# Patient Record
Sex: FEMALE | Race: BLACK OR AFRICAN AMERICAN | Employment: UNEMPLOYED | URBAN - METROPOLITAN AREA
[De-identification: names, ages, dates, MRNs, and addresses within clinical notes are randomized per-mention and may not be internally consistent; named-entity substitution may affect disease eponyms.]

---

## 2019-03-16 ENCOUNTER — HOSPITAL ENCOUNTER (EMERGENCY)
Age: 29
Discharge: HOME | End: 2019-03-16
Attending: EMERGENCY MEDICINE
Payer: COMMERCIAL

## 2019-03-16 VITALS
OXYGEN SATURATION: 99 % | TEMPERATURE: 98 F | WEIGHT: 137.57 LBS | SYSTOLIC BLOOD PRESSURE: 106 MMHG | HEART RATE: 76 BPM | BODY MASS INDEX: 25.16 KG/M2 | DIASTOLIC BLOOD PRESSURE: 64 MMHG | RESPIRATION RATE: 15 BRPM

## 2019-03-16 DIAGNOSIS — N39.0 URINARY TRACT INFECTION WITHOUT HEMATURIA, SITE UNSPECIFIED: Primary | ICD-10-CM

## 2019-03-16 LAB
BACTERIA: ABNORMAL /HPF
BILIRUBIN URINE: NEGATIVE
BLOOD, URINE: ABNORMAL
CLARITY: ABNORMAL
COLOR: ABNORMAL
EPITHELIAL CELLS, UA: ABNORMAL /HPF
GLUCOSE URINE: NEGATIVE MG/DL
HCG(URINE) PREGNANCY TEST: NEGATIVE
KETONES, URINE: NEGATIVE MG/DL
LEUKOCYTE ESTERASE, URINE: ABNORMAL
MICROSCOPIC EXAMINATION: YES
NITRITE, URINE: NEGATIVE
PH UA: 7 (ref 5–8)
PROTEIN UA: ABNORMAL MG/DL
RBC UA: ABNORMAL /HPF (ref 0–2)
RENAL EPITHELIAL, UA: ABNORMAL /HPF
SPECIFIC GRAVITY UA: 1.02 (ref 1–1.03)
URINE REFLEX TO CULTURE: YES
URINE TYPE: ABNORMAL
UROBILINOGEN, URINE: 0.2 E.U./DL
WBC UA: >100 /HPF (ref 0–5)

## 2019-03-16 PROCEDURE — 99283 EMERGENCY DEPT VISIT LOW MDM: CPT

## 2019-03-16 PROCEDURE — 81001 URINALYSIS AUTO W/SCOPE: CPT

## 2019-03-16 PROCEDURE — 87077 CULTURE AEROBIC IDENTIFY: CPT

## 2019-03-16 PROCEDURE — 87086 URINE CULTURE/COLONY COUNT: CPT

## 2019-03-16 PROCEDURE — 87186 SC STD MICRODIL/AGAR DIL: CPT

## 2019-03-16 PROCEDURE — 84703 CHORIONIC GONADOTROPIN ASSAY: CPT

## 2019-03-16 RX ORDER — PHENAZOPYRIDINE HYDROCHLORIDE 100 MG/1
100 TABLET, FILM COATED ORAL 3 TIMES DAILY PRN
Qty: 6 TABLET | Refills: 0 | Status: SHIPPED | OUTPATIENT
Start: 2019-03-16 | End: 2019-03-19

## 2019-03-16 RX ORDER — CEPHALEXIN 500 MG/1
500 CAPSULE ORAL 2 TIMES DAILY
Qty: 10 CAPSULE | Refills: 0 | Status: SHIPPED | OUTPATIENT
Start: 2019-03-16 | End: 2019-03-21

## 2019-03-16 ASSESSMENT — PAIN DESCRIPTION - ORIENTATION
ORIENTATION: MID
ORIENTATION: MID

## 2019-03-16 ASSESSMENT — PAIN DESCRIPTION - PROGRESSION
CLINICAL_PROGRESSION: NOT CHANGED
CLINICAL_PROGRESSION: NOT CHANGED

## 2019-03-16 ASSESSMENT — PAIN DESCRIPTION - ONSET
ONSET: ON-GOING
ONSET: ON-GOING

## 2019-03-16 ASSESSMENT — PAIN DESCRIPTION - FREQUENCY
FREQUENCY: INTERMITTENT
FREQUENCY: INTERMITTENT

## 2019-03-16 ASSESSMENT — PAIN DESCRIPTION - DESCRIPTORS
DESCRIPTORS: DISCOMFORT
DESCRIPTORS: DISCOMFORT;BURNING

## 2019-03-16 ASSESSMENT — PAIN DESCRIPTION - LOCATION
LOCATION: PELVIS
LOCATION: PELVIS

## 2019-03-16 ASSESSMENT — PAIN SCALES - GENERAL
PAINLEVEL_OUTOF10: 5
PAINLEVEL_OUTOF10: 4

## 2019-03-18 LAB
ORGANISM: ABNORMAL
URINE CULTURE, ROUTINE: ABNORMAL
URINE CULTURE, ROUTINE: ABNORMAL

## 2022-03-16 ENCOUNTER — APPOINTMENT (OUTPATIENT)
Dept: ULTRASOUND IMAGING | Age: 32
End: 2022-03-16
Payer: COMMERCIAL

## 2022-03-16 ENCOUNTER — HOSPITAL ENCOUNTER (EMERGENCY)
Age: 32
Discharge: HOME | End: 2022-03-16
Payer: COMMERCIAL

## 2022-03-16 VITALS
BODY MASS INDEX: 25.23 KG/M2 | SYSTOLIC BLOOD PRESSURE: 108 MMHG | RESPIRATION RATE: 18 BRPM | WEIGHT: 137.13 LBS | HEART RATE: 74 BPM | TEMPERATURE: 97.5 F | OXYGEN SATURATION: 99 % | DIASTOLIC BLOOD PRESSURE: 68 MMHG | HEIGHT: 62 IN

## 2022-03-16 DIAGNOSIS — Z33.1 IUP (INTRAUTERINE PREGNANCY), INCIDENTAL: ICD-10-CM

## 2022-03-16 DIAGNOSIS — Z32.01 POSITIVE PREGNANCY TEST: ICD-10-CM

## 2022-03-16 DIAGNOSIS — N83.202 LEFT OVARIAN CYST: ICD-10-CM

## 2022-03-16 DIAGNOSIS — R10.30 LOWER ABDOMINAL PAIN: Primary | ICD-10-CM

## 2022-03-16 DIAGNOSIS — Z20.2 STD EXPOSURE: ICD-10-CM

## 2022-03-16 LAB
BACTERIA WET PREP: NORMAL
BASOPHILS ABSOLUTE: 0.1 K/UL (ref 0–0.2)
BASOPHILS RELATIVE PERCENT: 0.9 %
BILIRUBIN URINE: NEGATIVE
BLOOD, URINE: NEGATIVE
CLARITY: CLEAR
CLUE CELLS: NORMAL
COLOR: YELLOW
EOSINOPHILS ABSOLUTE: 0.1 K/UL (ref 0–0.6)
EOSINOPHILS RELATIVE PERCENT: 1.9 %
EPITHELIAL CELLS WET PREP: NORMAL
GLUCOSE URINE: NEGATIVE MG/DL
GONADOTROPIN, CHORIONIC (HCG) QUANT: 3385 MIU/ML
HCG(URINE) PREGNANCY TEST: POSITIVE
HCT VFR BLD CALC: 37.9 % (ref 36–48)
HEMOGLOBIN: 12.5 G/DL (ref 12–16)
KETONES, URINE: NEGATIVE MG/DL
LEUKOCYTE ESTERASE, URINE: NEGATIVE
LYMPHOCYTES ABSOLUTE: 2.6 K/UL (ref 1–5.1)
LYMPHOCYTES RELATIVE PERCENT: 40.7 %
MCH RBC QN AUTO: 28.7 PG (ref 26–34)
MCHC RBC AUTO-ENTMCNC: 33.1 G/DL (ref 31–36)
MCV RBC AUTO: 86.8 FL (ref 80–100)
MICROSCOPIC EXAMINATION: NORMAL
MONOCYTES ABSOLUTE: 0.6 K/UL (ref 0–1.3)
MONOCYTES RELATIVE PERCENT: 8.9 %
NEUTROPHILS ABSOLUTE: 3 K/UL (ref 1.7–7.7)
NEUTROPHILS RELATIVE PERCENT: 47.6 %
NITRITE, URINE: NEGATIVE
PDW BLD-RTO: 14.3 % (ref 12.4–15.4)
PH UA: 6.5 (ref 5–8)
PLATELET # BLD: 352 K/UL (ref 135–450)
PMV BLD AUTO: 12.6 FL (ref 5–10.5)
PROTEIN UA: NEGATIVE MG/DL
RBC # BLD: 4.36 M/UL (ref 4–5.2)
RBC WET PREP: NORMAL
SOURCE WET PREP: NORMAL
SPECIFIC GRAVITY UA: <=1.005 (ref 1–1.03)
TRICHOMONAS PREP: NORMAL
URINE REFLEX TO CULTURE: NORMAL
URINE TYPE: NORMAL
UROBILINOGEN, URINE: 0.2 E.U./DL
WBC # BLD: 6.4 K/UL (ref 4–11)
WBC WET PREP: NORMAL
YEAST WET PREP: NORMAL

## 2022-03-16 PROCEDURE — 36415 COLL VENOUS BLD VENIPUNCTURE: CPT

## 2022-03-16 PROCEDURE — 96372 THER/PROPH/DIAG INJ SC/IM: CPT

## 2022-03-16 PROCEDURE — 81003 URINALYSIS AUTO W/O SCOPE: CPT

## 2022-03-16 PROCEDURE — 6370000000 HC RX 637 (ALT 250 FOR IP): Performed by: PHYSICIAN ASSISTANT

## 2022-03-16 PROCEDURE — 99282 EMERGENCY DEPT VISIT SF MDM: CPT

## 2022-03-16 PROCEDURE — 87210 SMEAR WET MOUNT SALINE/INK: CPT

## 2022-03-16 PROCEDURE — 6360000002 HC RX W HCPCS: Performed by: PHYSICIAN ASSISTANT

## 2022-03-16 PROCEDURE — 84702 CHORIONIC GONADOTROPIN TEST: CPT

## 2022-03-16 PROCEDURE — 87491 CHLMYD TRACH DNA AMP PROBE: CPT

## 2022-03-16 PROCEDURE — 87591 N.GONORRHOEAE DNA AMP PROB: CPT

## 2022-03-16 PROCEDURE — 85025 COMPLETE CBC W/AUTO DIFF WBC: CPT

## 2022-03-16 PROCEDURE — 84703 CHORIONIC GONADOTROPIN ASSAY: CPT

## 2022-03-16 PROCEDURE — 76817 TRANSVAGINAL US OBSTETRIC: CPT

## 2022-03-16 RX ORDER — AZITHROMYCIN 500 MG/1
1000 TABLET, FILM COATED ORAL ONCE
Status: COMPLETED | OUTPATIENT
Start: 2022-03-16 | End: 2022-03-16

## 2022-03-16 RX ORDER — ACETAMINOPHEN 500 MG
1000 TABLET ORAL 4 TIMES DAILY PRN
Qty: 60 TABLET | Refills: 0 | Status: SHIPPED | OUTPATIENT
Start: 2022-03-16

## 2022-03-16 RX ORDER — PRENATAL WITH FERROUS FUM AND FOLIC ACID 3080; 920; 120; 400; 22; 1.84; 3; 20; 10; 1; 12; 200; 27; 25; 2 [IU]/1; [IU]/1; MG/1; [IU]/1; MG/1; MG/1; MG/1; MG/1; MG/1; MG/1; UG/1; MG/1; MG/1; MG/1; MG/1
1 TABLET ORAL DAILY
Qty: 90 TABLET | Refills: 3 | Status: SHIPPED | OUTPATIENT
Start: 2022-03-16

## 2022-03-16 RX ORDER — CEFTRIAXONE 500 MG/1
500 INJECTION, POWDER, FOR SOLUTION INTRAMUSCULAR; INTRAVENOUS ONCE
Status: COMPLETED | OUTPATIENT
Start: 2022-03-16 | End: 2022-03-16

## 2022-03-16 RX ORDER — ACETAMINOPHEN 325 MG/1
650 TABLET ORAL ONCE
Status: COMPLETED | OUTPATIENT
Start: 2022-03-16 | End: 2022-03-16

## 2022-03-16 RX ADMIN — CEFTRIAXONE SODIUM 500 MG: 500 INJECTION, POWDER, FOR SOLUTION INTRAMUSCULAR; INTRAVENOUS at 14:09

## 2022-03-16 RX ADMIN — ACETAMINOPHEN 650 MG: 325 TABLET ORAL at 14:08

## 2022-03-16 RX ADMIN — AZITHROMYCIN 1000 MG: 500 TABLET, FILM COATED ORAL at 14:09

## 2022-03-16 ASSESSMENT — ENCOUNTER SYMPTOMS
ABDOMINAL PAIN: 1
COUGH: 0
SHORTNESS OF BREATH: 0
NAUSEA: 0
VOMITING: 0

## 2022-03-16 ASSESSMENT — PAIN SCALES - GENERAL: PAINLEVEL_OUTOF10: 0

## 2022-03-16 NOTE — ED NOTES
Pt transferred to [facility] for [facility], Pt stable and goes in private car     Shilpa Cabral, RN  03/16/22 3785

## 2022-03-16 NOTE — ED PROVIDER NOTES
[address]  [address] [address]  Dept: [phone]  Loc: [address] ENCOUNTER        This patient was not seen or evaluated by the attending physician. I evaluated this patient, the attending physician was available for consultation. CHIEF COMPLAINT    Chief Complaint   Patient presents with    Dysuria     x2wks, pain before urination, vaginal discharge with smell, concerns for UTI/STD and pregnancy       HPI    Sanjay Thakkar is a 27 y.o. female who presents from [facility] for a transvaginal ultrasound. Patient went to [facility] with complaints of abdominal pain localized in the left side of the abdomen. Onset was about 2 weeks. Had some pain before urination as well. Some unusual foul-smelling vaginal discharge and she was concern for STD. She was incidentally found to be pregnant. Did not know that she was pregnant. Has been pregnant before in the past, but has been several years. I received a call from [facility] at [facility], they do not have the ability to perform transvaginal ultrasounds at the freestanding emergency department, they had sent a quantitative which is pending at the time of transfer but she transferred the patient to [facility] emergency department to rule out ectopic. On arrival patient's pain is a 0 out of 10. She denies any vaginal bleeding or spotting. Came to the emergency department as directed for a ultrasound    REVIEW OF SYSTEMS    General: No fevers or chills  : No dysuria or flank pain  GI: see HPI, no vomiting, no diarrhea  Pulmonary: No difficulty breathing or cough  Neurologic: No loss of consciousness or syncope  See HPI for further details. All other systems reviewed and are negative. PAST MEDICAL & SURGICAL HISTORY    No past medical history on file.   No past surgical history on file.    CURRENT MEDICATIONS    Current Outpatient Rx   Medication Sig Dispense Refill    Prenatal Vit-Fe Fumarate-FA (PRENATAL VITAMIN) 27-1 MG TABS tablet Take 1 tablet by mouth daily 90 tablet 3    acetaminophen (TYLENOL) 500 MG tablet Take 2 tablets by mouth 4 times daily as needed for Pain 60 tablet 0       ALLERGIES    No Known Allergies    FAMILY AND SOCIAL HISTORY    No family history on file. Social History     Socioeconomic History    Marital status: Single     Spouse name: Not on file    Number of children: Not on file    Years of education: Not on file    Highest education level: Not on file   Occupational History    Not on file   Tobacco Use    Smoking status: Former Smoker    Smokeless tobacco: Never Used   Substance and Sexual Activity    Alcohol use: No    Drug use: No    Sexual activity: Not on file   Other Topics Concern    Not on file   Social History Narrative    Not on file     Social Determinants of Health     Financial Resource Strain:     Difficulty of Paying Living Expenses: Not on file   Food Insecurity:     Worried About [address] in the Last Year: Not on file    [address] in the Last Year: Not on file   Transportation Needs:     Lack of Transportation (Medical): Not on file    Lack of Transportation (Non-Medical):  Not on file   Physical Activity:     Days of Exercise per Week: Not on file    Minutes of Exercise per Session: Not on file   Stress:     Feeling of Stress : Not on file   Social Connections:     Frequency of Communication with Friends and Family: Not on file    Frequency of Social Gatherings with Friends and Family: Not on file    Attends Christian Services: Not on file    Active Member of Clubs or Organizations: Not on file    Attends Club or Organization Meetings: Not on file    Marital Status: Not on file   Intimate Partner Violence:     Fear of Current or Ex-Partner: Not on file    Emotionally Abused: Not on file    Physically Shock, Rh incompatibility, UTI, placenta previa, other    CRITICAL CARE NOTE:  There was a high probability of clinically significant life-threatening deterioration of the patient's condition requiring my urgent intervention. Total critical care time was at least 5 minutes. This includes vital sign monitoring, pulse oximetry monitoring, telemetry monitoring, clinical response to the IV medications, reviewing the nursing notes, consultation time, dictation/documentation time, and interpretation of the labwork. This excludes any separately billable procedures performed. Lab work and [facility] note and results were reviewed from patient's transfer to [facility].    Patient is afebrile nontoxic in appearance. She does not appear to be in any acute distress. Is not endorsing any pain whatsoever currently. She has no leukocytosis. No anemia. Urinalysis is completely unremarkable. Her quantitative that was pending at the time of her transfer did result while she was awaiting her ultrasound test results. Quantitative is 3385. Wet prep was grossly unremarkable. She was empirically treated for gonorrhea and chlamydia, please see [facility] note. Ella Hussein also had prescribed her prenatals. I will prescribe her some acetaminophen. On patient's arrival I ordered the transvaginal ultrasound    Ultrasound findings as above. I have a low index of suspicion that that cyst is products of conception as she does have a positive IUP on ultrasound. She has normal Doppler flow so this is not consistent with ovarian torsion. She has no current signs of pain, does not appear to be an ectopic pregnancy. She does however need close outpatient follow-up with OB/GYN. I will give her referral as she has no OB/GYN at this time.   She is in agreement with the plan of discharge and verbalized understanding to return immediately for any new or worsening symptoms and has no further questions or concerns and will be discharged home in stable condition. FINAL IMPRESSION    1. Lower abdominal pain    2. Positive pregnancy test    3. STD exposure    4. IUP (intrauterine pregnancy), incidental    5.  Left ovarian cyst        PLAN  Discharge with close outpatient PCP follow up    (Please note that this note was completed with a voice recognition program.  Every attempt was made to edit the dictations, but inevitably there remain words that are mis-transcribed.)         Ruthie Hancock, TANYA - THOMAS  03/16/22 2034

## 2022-03-16 NOTE — Clinical Note
Pamela Decker was seen and treated in our emergency department on 3/16/2022. She may return to work on 03/17/2022. If you have any questions or concerns, please don't hesitate to call.       Mony Diez, TANYA - CNP

## 2022-03-16 NOTE — ED PROVIDER NOTES
[facility]        Pt Name: Sonia Corado  MRN: [id number]  [place] 1990  Date of evaluation: 3/16/2022  Provider: KOKO Salmeron  PCP: No primary care provider on file. Note Started: 12:59 PM EDT       STEPHEN. I have evaluated this patient. My supervising physician was available for consultation. CHIEF COMPLAINT       Chief Complaint   Patient presents with    Dysuria     x2wks, pain before urination, vaginal discharge with smell, concerns for UTI/STD and pregnancy       HISTORY OF PRESENT ILLNESS   (Location, Timing/Onset, Context/Setting, Quality, Duration, Modifying Factors, Severity, Associated Signs and Symptoms)  Note limiting factors. Chief Complaint: Pain with urination, vaginal discahrge x 2 weeks     Sonia Corado is a 27 y.o. female who presents to the ED today with complaints of burning with urination, vaginal discharge that has been present for the last two weeks. LMP 02/10/2022. . Has not tried any pregnancy tests at home. Wonders if she could be pregnant. Has had intermittent cramping, especially in the left lower abdominal area. Last time she had the pain was last night, she is currently pain free. Denies vaginal bleeding. Follows with OBGYN at [facility]. Has no further complaints at this time. Nursing Notes were all reviewed and agreed with or any disagreements were addressed in the HPI. REVIEW OF SYSTEMS    (2-9 systems for level 4, 10 or more for level 5)     Review of Systems   Constitutional: Negative for chills and fever. Respiratory: Negative for cough and shortness of breath. Cardiovascular: Negative for chest pain and palpitations. Gastrointestinal: Positive for abdominal pain. Negative for nausea and vomiting. Genitourinary: Positive for dysuria, frequency, pelvic pain and vaginal discharge. Negative for difficulty urinating, urgency and vaginal bleeding.        Positives and Pertinent negatives as per HPI. Except as noted above in the ROS, all other systems were reviewed and negative. PAST MEDICAL HISTORY   No past medical history on file. SURGICAL HISTORY   No past surgical history on file. CURRENTMEDICATIONS       Previous Medications    No medications on file         ALLERGIES     Patient has no known allergies. FAMILYHISTORY     No family history on file. SOCIAL HISTORY       Social History     Tobacco Use    Smoking status: Former Smoker    Smokeless tobacco: Never Used   Substance Use Topics    Alcohol use: No    Drug use: No       SCREENINGS    Mikey Coma Scale  Eye Opening: Spontaneous  Best Verbal Response: Oriented  Best Motor Response: Obeys commands  Dawson Coma Scale Score: 15        PHYSICAL EXAM    (up to 7 for level 4, 8 or more for level 5)     ED Triage Vitals [03/16/22 1133]   BP Temp Temp Source Pulse Resp SpO2 Height Weight   108/68 97.5 °F (36.4 °C) Oral 74 16 99 % 5' 2\" (1.575 m) 137 lb 2 oz (62.2 kg)       Physical Exam  Vitals and nursing note reviewed. Exam conducted with a chaperone present. Constitutional:       General: She is not in acute distress. Appearance: Normal appearance. She is well-developed. She is not ill-appearing, toxic-appearing or diaphoretic. HENT:      Head: Normocephalic and atraumatic. Eyes:      Conjunctiva/sclera: Conjunctivae normal.      Pupils: Pupils are equal, round, and reactive to light. Cardiovascular:      Rate and Rhythm: Normal rate and regular rhythm. Pulmonary:      Effort: Pulmonary effort is normal. No respiratory distress. Breath sounds: Normal breath sounds. Abdominal:      General: There is no distension. Palpations: Abdomen is soft. Tenderness: There is abdominal tenderness (left lower). There is no guarding or rebound. Genitourinary:     Labia:         Right: No rash or injury. Left: No rash, tenderness, lesion or injury.        Urethra: No prolapse. Vagina: Normal.      Cervix: No lesion, erythema or cervical bleeding. Uterus: Absent. Adnexa: Right adnexa normal.        Left: Tenderness present. No mass. Comments:  exam performed with Enedelia Couch RN present at bedside. Skin:     General: Skin is warm and dry. Neurological:      Mental Status: She is alert and oriented to person, place, and time. Psychiatric:         Behavior: Behavior normal. Behavior is cooperative. Thought Content: Thought content normal.         DIAGNOSTIC RESULTS   LABS:    Labs Reviewed   CBC WITH AUTO DIFFERENTIAL - Abnormal; Notable for the following components:       Result Value    MPV 12.6 (*)     All other components within normal limits   WET PREP, GENITAL   C.TRACHOMATIS N.GONORRHOEAE DNA   URINALYSIS WITH REFLEX TO CULTURE   PREGNANCY, URINE   HCG, QUANTITATIVE, PREGNANCY       When ordered only abnormal lab results are displayed. All other labs were within normal range or not returned as of this dictation. EKG: When ordered, EKG's are interpreted by the Emergency Department Physician in the absence of a cardiologist.  Please see their note for interpretation of EKG. RADIOLOGY:   Non-plain film images such as CT, Ultrasound and MRI are read by the radiologist. Plain radiographic images are visualized and preliminarily interpreted by the ED Provider with the below findings:    Interpretation per the Radiologist below, if available at the time of this note:    No orders to display     No results found.         PROCEDURES   Unless otherwise noted below, none     Procedures     CONSULTS:  None      EMERGENCY DEPARTMENT COURSE and DIFFERENTIAL DIAGNOSIS/MDM:   Vitals:    Vitals:    03/16/22 1133   BP: 108/68   Pulse: 74   Resp: 16   Temp: 97.5 °F (36.4 °C)   TempSrc: Oral   SpO2: 99%   Weight: 137 lb 2 oz (62.2 kg)   Height: 5' 2\" (1.575 m)       Patient was given the following medications:  Medications   cefTRIAXone (ROCEPHIN) injection 500 mg (500 mg IntraMUSCular Given 3/16/22 1409)   azithromycin (ZITHROMAX) tablet 1,000 mg (1,000 mg Oral Given 3/16/22 1409)   acetaminophen (TYLENOL) tablet 650 mg (650 mg Oral Given 3/16/22 1408)           ED COURSE & MEDICAL DECISION MAKING    - The patient presented to the ER with complaints of burning with urination, abdominal pain that has been present intermittently for the last 2 weeks. Vital signs were reviewed. Exam as above. Labs ordered. - Pertinent Labs & Imaging studies reviewed. (See chart for details)   -  Patient seen and evaluated in the emergency department. -  Triage and nursing notes reviewed and incorporated. -  Old chart records reviewed and incorporated. -  STEPHEN. I have evaluated this patient. My supervising physician was available for consultation.  -  Differential diagnosis includes: STD, vaginitis, urinary tract infection, pyelonephritis, diverticulitis, pregnancy, ectopic pregnancy, ovarian cyst, ovarian torsion, other  -  Work-up included:  See above  -  ED treatment included:   Azithromycin, Rocephin, Tylenol  -  Results discussed with patient and/or family. Labs show no leukocytosis or concerning anemia, her beta quant is 3385. Her urine shows no evidence of infection. She is pregnant. Wet prep with no clue cells, trichomonas, or yeast.  Gonorrhea and Chlamydia test is pending she was empirically treated prior to leaving [address] [place] [address] emergency room. Given that the patient has some left-sided adnexal tenderness on exam, positive pregnancy test with no prior imaging, we do advise that she proceed to [facility] emergency room for ultrasound imaging to rule out ectopic pregnancy. I did contact Jazlyn Tan NP, to give report, who accepted the patient for transfer. The patient is stable and prefers to travel via personally operated vehicle.   The patient and/or family is agreeable with plan of care and disposition.  -  Disposition:   Transfer to [facility] for ultrasound  - Critical Care: 0 minutes    FINAL IMPRESSION      1. Lower abdominal pain    2. Positive pregnancy test    3. STD exposure          DISPOSITION/PLAN   DISPOSITION Decision To Transfer 03/16/2022 01:43:16 PM      PATIENT REFERRED TO:  No follow-up provider specified.     DISCHARGE MEDICATIONS:  New Prescriptions    PRENATAL VIT-FE FUMARATE-FA (PRENATAL VITAMIN) 27-1 MG TABS TABLET    Take 1 tablet by mouth daily       DISCONTINUED MEDICATIONS:  Discontinued Medications    No medications on file              (Please note that portions of this note were completed with a voice recognition program.  Efforts were made to edit the dictations but occasionally words are mis-transcribed.)    KOKO Stubbs (electronically signed)            Dominga Hill, [address]  03/16/22 5991

## 2022-03-17 LAB
C TRACH DNA GENITAL QL NAA+PROBE: POSITIVE
N. GONORRHOEAE DNA: NEGATIVE